# Patient Record
Sex: MALE | Race: WHITE | NOT HISPANIC OR LATINO | Employment: FULL TIME | URBAN - METROPOLITAN AREA
[De-identification: names, ages, dates, MRNs, and addresses within clinical notes are randomized per-mention and may not be internally consistent; named-entity substitution may affect disease eponyms.]

---

## 2017-10-24 LAB
EXTERNAL HIV CONFIRMATION: NORMAL
EXTERNAL HIV SCREEN: NORMAL

## 2018-06-18 ENCOUNTER — TRANSCRIBE ORDERS (OUTPATIENT)
Dept: ADMINISTRATIVE | Facility: HOSPITAL | Age: 53
End: 2018-06-18

## 2018-06-18 DIAGNOSIS — G47.30 SLEEP APNEA, UNSPECIFIED TYPE: Primary | ICD-10-CM

## 2021-04-06 DIAGNOSIS — Z23 ENCOUNTER FOR IMMUNIZATION: ICD-10-CM

## 2022-01-28 ENCOUNTER — APPOINTMENT (OUTPATIENT)
Dept: RADIOLOGY | Facility: CLINIC | Age: 57
End: 2022-01-28
Payer: COMMERCIAL

## 2022-01-28 VITALS
HEIGHT: 67 IN | SYSTOLIC BLOOD PRESSURE: 150 MMHG | BODY MASS INDEX: 39.39 KG/M2 | DIASTOLIC BLOOD PRESSURE: 90 MMHG | WEIGHT: 251 LBS

## 2022-01-28 DIAGNOSIS — M17.32 POST-TRAUMATIC OSTEOARTHRITIS OF LEFT KNEE: Primary | ICD-10-CM

## 2022-01-28 DIAGNOSIS — M25.562 LEFT KNEE PAIN, UNSPECIFIED CHRONICITY: ICD-10-CM

## 2022-01-28 PROCEDURE — 73564 X-RAY EXAM KNEE 4 OR MORE: CPT

## 2022-01-28 PROCEDURE — 20610 DRAIN/INJ JOINT/BURSA W/O US: CPT | Performed by: ORTHOPAEDIC SURGERY

## 2022-01-28 PROCEDURE — 99203 OFFICE O/P NEW LOW 30 MIN: CPT | Performed by: ORTHOPAEDIC SURGERY

## 2022-01-28 RX ORDER — ALBUTEROL SULFATE 90 UG/1
2 AEROSOL, METERED RESPIRATORY (INHALATION) EVERY 4 HOURS PRN
COMMUNITY
Start: 2021-11-02

## 2022-01-28 RX ORDER — SERTRALINE HYDROCHLORIDE 100 MG/1
150 TABLET, FILM COATED ORAL DAILY
COMMUNITY
Start: 2021-12-26

## 2022-01-28 RX ORDER — LEVOTHYROXINE SODIUM 0.12 MG/1
125 TABLET ORAL DAILY
COMMUNITY
Start: 2021-11-16 | End: 2022-11-16

## 2022-01-28 RX ORDER — MULTIVITAMIN
1 CAPSULE ORAL DAILY
COMMUNITY

## 2022-01-28 RX ORDER — MONTELUKAST SODIUM 10 MG/1
10 TABLET ORAL
COMMUNITY

## 2022-01-28 RX ORDER — CHOLECALCIFEROL (VITAMIN D3) 125 MCG
2000 CAPSULE ORAL DAILY
COMMUNITY

## 2022-01-28 RX ORDER — LISINOPRIL 10 MG/1
TABLET ORAL DAILY
COMMUNITY
Start: 2021-12-13

## 2022-01-28 RX ORDER — LIDOCAINE HYDROCHLORIDE 10 MG/ML
2.5 INJECTION, SOLUTION INFILTRATION; PERINEURAL
Status: COMPLETED | OUTPATIENT
Start: 2022-01-28 | End: 2022-01-28

## 2022-01-28 RX ORDER — METHYLPREDNISOLONE ACETATE 40 MG/ML
1 INJECTION, SUSPENSION INTRA-ARTICULAR; INTRALESIONAL; INTRAMUSCULAR; SOFT TISSUE
Status: COMPLETED | OUTPATIENT
Start: 2022-01-28 | End: 2022-01-28

## 2022-01-28 RX ORDER — PRAMIPEXOLE DIHYDROCHLORIDE 0.25 MG/1
0.25 TABLET ORAL
COMMUNITY
Start: 2022-01-09 | End: 2022-02-04 | Stop reason: SDUPTHER

## 2022-01-28 RX ORDER — OMEGA-3 FATTY ACIDS CAP DELAYED RELEASE 1000 MG 1000 MG
CAPSULE DELAYED RELEASE ORAL
COMMUNITY

## 2022-01-28 RX ADMIN — METHYLPREDNISOLONE ACETATE 1 ML: 40 INJECTION, SUSPENSION INTRA-ARTICULAR; INTRALESIONAL; INTRAMUSCULAR; SOFT TISSUE at 14:45

## 2022-01-28 RX ADMIN — LIDOCAINE HYDROCHLORIDE 2.5 ML: 10 INJECTION, SOLUTION INFILTRATION; PERINEURAL at 14:45

## 2022-01-28 NOTE — PROGRESS NOTES
Ortho Sports Medicine Knee Visit     Assesment:   left knee post traumatic osteoarthritis more pronounced medial, history ACL reconstruction    Plan:    Conservative treatment:    Ice to knee for 20 minutes at least 1-2 times daily  PT for ROM/strengthening to knee, hip and core  OTC NSAIDS prn for pain  Let pain guide gradual return activities  See patient back in 8 weeks    Imaging: All imaging from today was reviewed by myself and explained to the patient  Injection:    The risks and benefits of the injection (which include but are not limited to: infection, bleeding,damage to nerve/artery, need for further intervention), as well as the risks and benefits of all alternative treatments were explained and understood  The patient elected to proceed with injection  The procedure was done with aseptic technique, and the patient tolerated the procedure well with no complications  A corticosteroid injection was performed in left knee  The patient should take 1-2 days off of activity, with gradual return to activity as tolerated  Ice to the knee 1-2 times daily for 20 minutes, for next 24-48 hrs  Surgery:     No surgery is recommended at this point, continue with conservative treatment plan as noted  History of Present Illness: The patient is a 49 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, referred to me by themself, seen in clinic for evaluation of left knee pain  Pain is located anterior, medial   The patient rates the pain as a 2/10  The pain has been present for 5 months  The patient sustained no specific injury or trauma, just gradual progression of pain  He was being seen at [facility] by Dr La Score had CSI which helped for 6 weeks  He did not care for Dr Marti blunt  Patient has history of ACL reconstruction years ago by Dr Cecelia Mercedes at [facility]  He is wearing ACL functional brace which he just bought for support of his knee   He also has recent MRI of left knee showing stable ACL graft with grade 4 changes MFC  Daily dull ache in knee with sharp pain at times in medial aspect  Denies any instability  Pain is improved by rest, ice, NSAIDS, bracing and injection  Pain is aggravated by squatting and weight bearing, pivoting or twisting motions  Symptoms include clicking and swelling  The patient has tried rest, ice, NSAIDS, bracing and injection  Knee Surgical History:  ACL    Past Medical, Social and Family History:  Past Medical History:   Diagnosis Date    Asthma     Cancer ([facility] [address] )     Depression     Hypertension      Past Surgical History:   Procedure Laterality Date    ARTHROSCOPIC REPAIR ACL Left 2000    THYROID LOBECTOMY  2011     No Known Allergies  Current Outpatient Medications on File Prior to Visit   Medication Sig Dispense Refill    albuterol (Ventolin HFA) 90 mcg/act inhaler Inhale 2 puffs every 4 (four) hours as needed      Cholecalciferol (Vitamin D3) 50 MCG (2000 UT) TABS Take 2,000 Units by mouth daily      Ferrous Gluconate (IRON 27 PO) Take by mouth      levothyroxine 125 mcg tablet Take 125 mcg by mouth daily      montelukast (Singulair) 10 mg tablet Take 10 mg by mouth daily at bedtime      Multiple Vitamin (multivitamin) capsule Take 1 capsule by mouth daily      Omega-3 Fatty Acids (Fish Oil) 1000 MG CPDR Take by mouth      lisinopril (ZESTRIL) 10 mg tablet in the morning      pramipexole (MIRAPEX) 0 25 mg tablet       sertraline (ZOLOFT) 100 mg tablet 150 mg in the morning       No current facility-administered medications on file prior to visit       Social History     Socioeconomic History    Marital status: /Civil Union     Spouse name: Not on file    Number of children: Not on file    Years of education: Not on file    Highest education level: Not on file   Occupational History    Not on file   Tobacco Use    Smoking status: Never Smoker    Smokeless tobacco: Never Used   Substance and Sexual Activity    Alcohol use: Not on file    Drug use: Not on file    Sexual activity: Not on file   Other Topics Concern    Not on file   Social History Narrative    Not on file     Social Determinants of Health     Financial Resource Strain: Not on file   Food Insecurity: Not on file   Transportation Needs: Not on file   Physical Activity: Not on file   Stress: Not on file   Social Connections: Not on file   Intimate Partner Violence: Not on file   Housing Stability: Not on file         I have reviewed the past medical, surgical, social and family history, medications and allergies as documented in the EMR  Review of systems: ROS is negative other than that noted in the HPI  Constitutional: Negative for fatigue and fever  HENT: Negative for sore throat  Respiratory: Negative for shortness of breath  Cardiovascular: Negative for chest pain  Gastrointestinal: Negative for abdominal pain  Endocrine: Negative for cold intolerance and heat intolerance  Genitourinary: Negative for flank pain  Musculoskeletal: Negative for back pain  Skin: Negative for rash  Allergic/Immunologic: Negative for immunocompromised state  Neurological: Negative for dizziness  Psychiatric/Behavioral: Negative for agitation  Physical Exam:    Blood pressure 150/90, height 5' 7" (1 702 m), weight 114 kg (251 lb)      General/Constitutional: NAD, well developed, well nourished  HENT: Normocephalic, atraumatic  CV: Intact distal pulses, regular rate  Resp: No respiratory distress or labored breathing  Lymphatic: No lymphadenopathy palpated  Neuro: Alert and Oriented x 3, no focal deficits  Psych: Normal mood, normal affect, normal judgement, normal behavior  Skin: Warm, dry, no rashes, no erythema       Knee Exam (focused):                  RIGHT LEFT   ROM:   0-130 0-130   Palpation: Effusion negative mild     MJL tenderness Negative Positive     LJL tenderness Negative Negative   Instability: Varus stable stable     Valgus stable stable Special Tests: Lachman Negative Negative     Posterior drawer Negative Negative     Anterior drawer Negative Negative     Pivot shift not tested not tested     Dial not tested not tested   Patella: Palpation no tenderness Medial facet     Mobility 1/4 1/4     Apprehension Negative Negative   Other: Single leg 1/4 squat not tested not tested      LE NV Exam: +2 DP/PT pulses bilaterally  Sensation intact to light touch L2-S1 bilaterally     Bilateral hip ROM demonstrates no pain actively or passively    No calf tenderness to palpation bilaterally    Knee Imaging    X-rays of the left knee were reviewed, which demonstrate post surgical changes ACL reconstruction with stable hardware screw fixation, degenerative changes medial and patellofemoral compartments with spurring, marginal osteophytes  I have reviewed the radiology report and do not currently have a radiology reading from [facility], but will check the result once the reading is performed  MRI of the left knee report  were reviewed, which demonstrate stable ACL graft, post surgical changes in line with medial meniscectomy, post traumatic arthritis grade IV MFC  I have reviewed the radiology report and agree with their impression  Large joint arthrocentesis: L knee  Universal Protocol:  Consent: Verbal consent obtained    Risks and benefits: risks, benefits and alternatives were discussed  Consent given by: patient  Patient understanding: patient states understanding of the procedure being performed  Site marked: the operative site was marked  Patient identity confirmed: verbally with patient    Supporting Documentation  Indications: pain   Procedure Details  Location: knee - L knee  Preparation: Patient was prepped and draped in the usual sterile fashion  Needle size: 22 G  Ultrasound guidance: no  Approach: anterolateral  Medications administered: 2 5 mL lidocaine 1 %; 1 mL methylPREDNISolone acetate 40 mg/mL    Patient tolerance: patient tolerated the procedure well with no immediate complications  Dressing:  Sterile dressing applied          Scribe Attestation    I,:   am acting as a scribe while in the presence of the attending physician :       I,:   personally performed the services described in this documentation    as scribed in my presence :

## 2022-09-29 VITALS
WEIGHT: 256 LBS | BODY MASS INDEX: 40.18 KG/M2 | SYSTOLIC BLOOD PRESSURE: 148 MMHG | HEART RATE: 66 BPM | HEIGHT: 67 IN | DIASTOLIC BLOOD PRESSURE: 91 MMHG

## 2022-09-29 DIAGNOSIS — M17.32 POST-TRAUMATIC OSTEOARTHRITIS OF LEFT KNEE: Primary | ICD-10-CM

## 2022-09-29 PROCEDURE — 99213 OFFICE O/P EST LOW 20 MIN: CPT | Performed by: ORTHOPAEDIC SURGERY

## 2022-09-29 RX ORDER — ATORVASTATIN CALCIUM 10 MG/1
TABLET, FILM COATED ORAL
COMMUNITY
Start: 2022-04-08

## 2022-09-29 NOTE — PROGRESS NOTES
Ortho Sports Medicine Knee Visit     Assesment:   left knee osteoarthritis    Plan:    The patient has periodic flares of symptoms  He is currently doing well  He did find lasting relief from last steroid injection  Plan to return 1/2023 prior to vacation is made for consideration of steroid injection  Follow up in 3 months or as needed  Conservative treatment:    Ice to knee for 20 minutes at least 1-2 times daily  OTC NSAIDS prn for pain  Imaging: All imaging from today was reviewed by myself and explained to the patient  Injection:    No Injection planned at this time  Patient will return in 1/2022 if needed      Surgery:     No surgery is recommended at this point, continue with conservative treatment plan as noted  History of Present Illness: The patient presents for follow up of left knee  He is s/p left knee steriod injection with lasting benefit, 1/28/2022  Today he complains of occasional left anteromedial > anterolateral left knee pain  He rates hs pan at 0/10 and 8/10 at its worse  He will use Advil and Voltaren gel on occasion  He has history of left ACL reconstruction 20 years ago  I have reviewed the past medical, surgical, social and family history, medications and allergies as documented in the EMR  Review of systems: ROS is negative other than that noted in the HPI  Constitutional: Negative for fatigue and fever  Cardiovascular: Negative for chest pain  Pulmonary: negative for shortness of breath    PMH/PSH:  Past Medical History:   Diagnosis Date    Asthma     Cancer ([facility] [address] [facility]     Depression     Hypertension      Past Surgical History:   Procedure Laterality Date    APPENDECTOMY      ARTHROSCOPIC REPAIR ACL Left 2000    HERNIA REPAIR      THYROID LOBECTOMY  2011        Physical Exam:    Blood pressure 148/91, pulse 66, height 5' 7" (1 702 m), weight 116 kg (256 lb)      General/Constitutional: NAD, well developed, well nourished  HENT: Normocephalic, atraumatic  CV: Intact distal pulses, regular rate  Resp: No respiratory distress or labored breathing  Lymphatic: No lymphadenopathy palpated  Neuro: Alert and Oriented x 3, no focal deficits  Psych: Normal mood, normal affect, normal judgement, normal behavior  Skin: Warm, dry, no rashes, no erythema       Knee Exam (focused):                   RIGHT LEFT   ROM:   0-130 0-130   Palpation: Effusion negative negative     MJL tenderness mild Negative     LJL tenderness Negative Negative   Instability: Varus stable stable     Valgus stable stable   Special Tests: Lachman Negative Negative     Posterior drawer Negative Negative     Anterior drawer Negative Negative     Pivot shift not tested not tested     Dial not tested not tested   Patella: Palpation no tenderness no tenderness     Mobility 1/4 1/4     Apprehension Negative Negative   Other: Single leg 1/4 squat not tested not tested      LE NV Exam: +2 DP/PT pulses bilaterally  Sensation intact to light touch L2-S1 bilaterally    No calf tenderness to palpation bilaterally      Knee Imaging    None performed     Scribe Attestation    I,:  Arpan Morales am acting as a scribe while in the presence of the attending physician :       I,:  Sebastien Giraldo DO personally performed the services described in this documentation    as scribed in my presence :

## 2023-01-04 ENCOUNTER — OFFICE VISIT (OUTPATIENT)
Dept: OBGYN CLINIC | Facility: MEDICAL CENTER | Age: 58
End: 2023-01-04

## 2023-01-04 VITALS
HEIGHT: 67 IN | DIASTOLIC BLOOD PRESSURE: 90 MMHG | BODY MASS INDEX: 38.77 KG/M2 | SYSTOLIC BLOOD PRESSURE: 141 MMHG | WEIGHT: 247 LBS | HEART RATE: 76 BPM

## 2023-01-04 DIAGNOSIS — M17.32 POST-TRAUMATIC OSTEOARTHRITIS OF LEFT KNEE: Primary | ICD-10-CM

## 2023-01-04 RX ORDER — METHYLPREDNISOLONE ACETATE 40 MG/ML
1 INJECTION, SUSPENSION INTRA-ARTICULAR; INTRALESIONAL; INTRAMUSCULAR; SOFT TISSUE
Status: COMPLETED | OUTPATIENT
Start: 2023-01-04 | End: 2023-01-04

## 2023-01-04 RX ORDER — BUPIVACAINE HYDROCHLORIDE 5 MG/ML
2 INJECTION, SOLUTION EPIDURAL; INTRACAUDAL
Status: COMPLETED | OUTPATIENT
Start: 2023-01-04 | End: 2023-01-04

## 2023-01-04 RX ADMIN — BUPIVACAINE HYDROCHLORIDE 2 ML: 5 INJECTION, SOLUTION EPIDURAL; INTRACAUDAL at 14:07

## 2023-01-04 RX ADMIN — METHYLPREDNISOLONE ACETATE 1 ML: 40 INJECTION, SUSPENSION INTRA-ARTICULAR; INTRALESIONAL; INTRAMUSCULAR; SOFT TISSUE at 14:07

## 2023-01-04 NOTE — PROGRESS NOTES
Ortho Sports Medicine Knee Visit     Assesment:   left knee post traumatic osteoarthritis    Plan:    Conservative treatment:    Ice to knee for 20 minutes at least 1-2 times daily  OTC NSAIDS prn for pain  Repeat CSI in 3 months if needed  Imaging:    No imaging was available for review today  Injection:    The risks and benefits of the injection (which include but are not limited to: infection, bleeding,damage to nerve/artery, need for further intervention), as well as the risks and benefits of all alternative treatments were explained and understood  The patient elected to proceed with injection  The procedure was done with aseptic technique, and the patient tolerated the procedure well with no complications  A corticosteroid injection was performed  The patient should take 1-2 days off of activity, with gradual return to activity as tolerated  Ice to the knee 1-2 times daily for 20 minutes, for next 24-48 hrs  Surgery:     No surgery is recommended at this point, continue with conservative treatment plan as noted  History of Present Illness:    Patient presents for follow up of left knee  He is s/p left knee steriod injection with lasting benefit, 9/29/2022   he states the last injection has worn off  Pain medial based primarily  He rates his pain at 0/10 and 8/10 at its worse  He will use Advil and Voltaren gel on occasion  He has history of left ACL reconstruction 20 years ago  Requesting repeat CSI today prior to vacation  I have reviewed the past medical, surgical, social and family history, medications and allergies as documented in the EMR  Review of systems: ROS is negative other than that noted in the HPI  Constitutional: Negative for fatigue and fever     Cardiovascular: Negative for chest pain  Pulmonary: negative for shortness of breath    PMH/PSH:  Past Medical History:   Diagnosis Date   • Asthma    • Cancer [facility])    • Depression    • Hypertension Past Surgical History:   Procedure Laterality Date   • APPENDECTOMY     • ARTHROSCOPIC REPAIR ACL Left 2000   • HERNIA REPAIR     • THYROID LOBECTOMY  2011        Physical Exam:    Blood pressure 141/90, pulse 76, height 5' 7" (1 702 m), weight 112 kg (247 lb)  General/Constitutional: NAD, well developed, well nourished  HENT: Normocephalic, atraumatic  CV: Intact distal pulses, regular rate  Resp: No respiratory distress or labored breathing  Lymphatic: No lymphadenopathy palpated  Neuro: Alert and Oriented x 3, no focal deficits  Psych: Normal mood, normal affect, normal judgement, normal behavior  Skin: Warm, dry, no rashes, no erythema       Knee Exam (focused): RIGHT LEFT   ROM:   0-130 0-130   Palpation: Effusion negative negative     MJL tenderness Negative Positive     LJL tenderness Negative Negative   Instability: Varus stable stable     Valgus stable stable   Special Tests: Lachman Negative Negative     Posterior drawer Negative Negative     Anterior drawer Negative Negative     Pivot shift not tested not tested     Dial not tested not tested   Patella: Palpation no tenderness no tenderness     Mobility 1/4 1/4     Apprehension Negative Negative   Other: Single leg 1/4 squat not tested not tested      LE NV Exam: +2 DP/PT pulses bilaterally  Sensation intact to light touch L2-S1 bilaterally    No calf tenderness to palpation bilaterally      Knee Imaging    No new imaging to review     Large joint arthrocentesis: L knee  Universal Protocol:  Consent: Verbal consent obtained    Risks and benefits: risks, benefits and alternatives were discussed  Consent given by: patient  Patient understanding: patient states understanding of the procedure being performed  Site marked: the operative site was marked  Patient identity confirmed: verbally with patient    Supporting Documentation  Indications: pain   Procedure Details  Location: knee - L knee  Preparation: Patient was prepped and draped in the usual sterile fashion  Needle size: 22 G  Ultrasound guidance: no  Approach: anterolateral  Medications administered: 2 mL bupivacaine (PF) 0 5 %; 1 mL methylPREDNISolone acetate 40 mg/mL    Patient tolerance: patient tolerated the procedure well with no immediate complications  Dressing:  Sterile dressing applied          Scribe Attestation    I,:  Goyo Paula am acting as a scribe while in the presence of the attending physician :       I,:  48 Radha Giraldo DO personally performed the services described in this documentation    as scribed in my presence :

## 2024-02-28 ENCOUNTER — TELEPHONE (OUTPATIENT)
Age: 59
End: 2024-02-28

## 2024-05-14 ENCOUNTER — TELEPHONE (OUTPATIENT)
Dept: ADMINISTRATIVE | Facility: OTHER | Age: 59
End: 2024-05-14

## 2024-05-14 NOTE — TELEPHONE ENCOUNTER
Upon review of the In Basket request we were able to locate, review, and update the patient chart as requested for HIV.    Any additional questions or concerns should be emailed to the Practice Liaisons via the appropriate education email address, please do not reply via In Basket.    Thank you  Noa Mcconnell

## 2024-05-14 NOTE — TELEPHONE ENCOUNTER
----- Message from Andrews Ibarra sent at 5/14/2024  9:13 AM EDT -----  Regarding: HIV test  05/14/24 9:13 AM    Hello, our patient attached above has had HIV completed/performed. Please assist in updating the patient chart by pulling the Care Everywhere (CE) document. The date of service is 10/24/2017.     Thank you,  ANDREWS IBARRA  [facility]

## 2024-05-15 ENCOUNTER — OFFICE VISIT (OUTPATIENT)
Dept: FAMILY MEDICINE CLINIC | Facility: CLINIC | Age: 59
End: 2024-05-15
Payer: COMMERCIAL

## 2024-05-15 VITALS
SYSTOLIC BLOOD PRESSURE: 128 MMHG | HEIGHT: 68 IN | DIASTOLIC BLOOD PRESSURE: 80 MMHG | OXYGEN SATURATION: 95 % | BODY MASS INDEX: 38.74 KG/M2 | HEART RATE: 73 BPM | WEIGHT: 255.6 LBS | TEMPERATURE: 97.5 F

## 2024-05-15 DIAGNOSIS — Z76.89 ESTABLISHING CARE WITH NEW DOCTOR, ENCOUNTER FOR: Primary | ICD-10-CM

## 2024-05-15 DIAGNOSIS — R73.03 PREDIABETES: ICD-10-CM

## 2024-05-15 DIAGNOSIS — I10 PRIMARY HYPERTENSION: ICD-10-CM

## 2024-05-15 DIAGNOSIS — Z12.5 SCREENING FOR PROSTATE CANCER: ICD-10-CM

## 2024-05-15 DIAGNOSIS — E78.01 FAMILIAL HYPERCHOLESTEROLEMIA: ICD-10-CM

## 2024-05-15 DIAGNOSIS — E89.0 POSTOPERATIVE HYPOTHYROIDISM: ICD-10-CM

## 2024-05-15 DIAGNOSIS — C73 MALIGNANT NEOPLASM OF THYROID GLAND (HCC): ICD-10-CM

## 2024-05-15 PROCEDURE — 99214 OFFICE O/P EST MOD 30 MIN: CPT | Performed by: FAMILY MEDICINE

## 2024-05-15 NOTE — PROGRESS NOTES
Ambulatory Visit  Name: Dayne Kennedy      : 1965      MRN: [id number]  Encounter Provider: Alicia Barron DO  Encounter Date: 5/15/2024   Encounter department: [facility]    Assessment & Plan   1. Establishing care with new doctor, encounter for  2. Primary hypertension  -     Comprehensive metabolic panel; Future; Expected date: 10/31/2024  3. Prediabetes  -     Hemoglobin A1C; Future; Expected date: 10/31/2024  -     Comprehensive metabolic panel; Future; Expected date: 10/31/2024  4. Postoperative hypothyroidism  -     TSH, 3rd generation; Future; Expected date: 10/31/2024  5. Screening for prostate cancer  -     PSA, Total Screen; Future; Expected date: 10/31/2024  6. Familial hypercholesterolemia  -     Lipid Panel with Direct LDL reflex; Future; Expected date: 10/31/2024  7. Malignant neoplasm of thyroid gland (HCC)  -     TSH, 3rd generation; Future; Expected date: 10/31/2024      Depression Screening and Follow-up Plan: Patient was screened for depression during today's encounter. They screened negative with a PHQ-2 score of 2.      Chief Complaint   Patient presents with   • Follow-up     New pt , needs primary care his dr retired    Patient establishing care here, Dr. Monica Alvarez has left [facility] practice.  Patient follows with endocrinology for history of thyroid cancer and thyroidectomy.  On Synthroid.  Also prediabetes.  Dental UTD   Eye Check up UTD  DERM yearly-- ASA  Works in IT at [employer]  History of Present Illness     Here to establish care      Review of Systems   HENT:  Positive for tinnitus (Chronic). Negative for hearing loss.    Gastrointestinal: Negative.         Occasional blood on toilet paper.  Has had negative Cologuard.  Defers colonoscopy.   Genitourinary: Negative.    Neurological: Negative.    Hematological:  Does not bruise/bleed easily.   All other systems reviewed and are negative.    Past Medical History:   Diagnosis Date   •  "Asthma    • Cancer (HCC)    • Depression    • Hypertension      Past Surgical History:   Procedure Laterality Date   • APPENDECTOMY  2020   • ARTHROSCOPIC REPAIR ACL Left 2000   • HERNIA REPAIR     • THYROID LOBECTOMY  2011     Family History   Problem Relation Age of Onset   • Dementia Mother    • Hypertension Mother    • Hypertension Father    • Lung cancer Father      Social History     Tobacco Use   • Smoking status: Never   • Smokeless tobacco: Never   Vaping Use   • Vaping status: Never Used   Substance and Sexual Activity   • Alcohol use: Not on file     Comment: rarely   • Drug use: Never   • Sexual activity: Yes     Partners: Male     Current Outpatient Medications on File Prior to Visit   Medication Sig   • albuterol (PROVENTIL HFA,VENTOLIN HFA) 90 mcg/act inhaler Inhale 2 puffs every 4 (four) hours as needed   • atorvastatin (LIPITOR) 10 mg tablet TAKE 1 TABLET(10 MG) BY MOUTH EVERY NIGHT   • Cholecalciferol (Vitamin D3) 50 MCG (2000 UT) TABS Take 2,000 Units by mouth daily   • Glucosamine HCl (GLUCOSAMINE PO) Take by mouth daily   • levothyroxine 125 mcg tablet Take 125 mcg by mouth daily   • lisinopril (ZESTRIL) 10 mg tablet in the morning   • montelukast (SINGULAIR) 10 mg tablet Take 10 mg by mouth daily at bedtime   • Multiple Vitamin (multivitamin) capsule Take 1 capsule by mouth daily   • Omega-3 Fatty Acids (Fish Oil) 1000 MG CPDR Take by mouth   • pramipexole (MIRAPEX) 0.25 mg tablet Take 2 or 3 by mouth at bedtime   • sertraline (ZOLOFT) 100 mg tablet 150 mg in the morning   • [DISCONTINUED] Ferrous Gluconate (IRON 27 PO) Take by mouth (Patient not taking: Reported on 9/29/2022)     No Known Allergies  Immunization History   Administered Date(s) Administered   • COVID-19 MODERNA VACC 0.5 ML IM 03/30/2021, 04/27/2021, 01/18/2022   • COVID-19 Moderna mRNA Vaccine 12 Yr+ 50 mcg/0.5 mL (Spikevax) 12/04/2023     Objective     /80   Pulse 73   Temp 97.5 °F (36.4 °C) (Temporal)   Ht 5' 7.76\" " (1.721 m)   Wt 116 kg (255 lb 9.6 oz)   SpO2 95%   BMI 39.14 kg/m²     Physical Exam  Vitals and nursing note reviewed.   Constitutional:       General: He is not in acute distress.     Appearance: Normal appearance. He is well-developed. He is obese.   HENT:      Head: Normocephalic.      Right Ear: Tympanic membrane normal.      Left Ear: Tympanic membrane normal.      Nose: Nose normal.      Mouth/Throat:      Mouth: Mucous membranes are moist.   Eyes:      Conjunctiva/sclera: Conjunctivae normal.   Neck:      Vascular: No carotid bruit.   Cardiovascular:      Rate and Rhythm: Normal rate and regular rhythm.      Heart sounds: No murmur heard.  Pulmonary:      Effort: Pulmonary effort is normal. No respiratory distress.      Breath sounds: Normal breath sounds.   Abdominal:      Palpations: Abdomen is soft.      Tenderness: There is no abdominal tenderness.      Comments: No abdominal or femoral bruit   Musculoskeletal:         General: No swelling.   Skin:     Findings: No rash.   Neurological:      Mental Status: He is alert and oriented to person, place, and time.      Gait: Gait normal.   Psychiatric:         Mood and Affect: Mood normal.         Behavior: Behavior normal.         Thought Content: Thought content normal.         Judgment: Judgment normal.       Administrative Statements   I have spent a total time of 25 minutes on 05/15/24 In caring for this patient including Diagnostic results, Instructions for management, Patient and family education, Importance of tx compliance, Risk factor reductions, Impressions, Counseling / Coordination of care, Documenting in the medical record, Reviewing / ordering tests, medicine, procedures  , and Obtaining or reviewing history  .

## 2024-06-04 DIAGNOSIS — Z00.6 ENCOUNTER FOR EXAMINATION FOR NORMAL COMPARISON OR CONTROL IN CLINICAL RESEARCH PROGRAM: ICD-10-CM

## 2024-06-10 ENCOUNTER — APPOINTMENT (OUTPATIENT)
Dept: LAB | Facility: CLINIC | Age: 59
End: 2024-06-10

## 2024-06-10 DIAGNOSIS — Z00.6 ENCOUNTER FOR EXAMINATION FOR NORMAL COMPARISON OR CONTROL IN CLINICAL RESEARCH PROGRAM: ICD-10-CM

## 2024-06-10 PROCEDURE — 36415 COLL VENOUS BLD VENIPUNCTURE: CPT

## 2024-06-26 LAB
APOB+LDLR+PCSK9 GENE MUT ANL BLD/T: NOT DETECTED
BRCA1+BRCA2 DEL+DUP + FULL MUT ANL BLD/T: NOT DETECTED
MLH1+MSH2+MSH6+PMS2 GN DEL+DUP+FUL M: NOT DETECTED

## 2024-07-17 ENCOUNTER — HOSPITAL ENCOUNTER (OUTPATIENT)
Dept: RADIOLOGY | Facility: HOSPITAL | Age: 59
Discharge: HOME | End: 2024-07-17
Attending: PODIATRIST
Payer: COMMERCIAL

## 2024-07-17 DIAGNOSIS — M84.376A STRESS FRACTURE OF FOOT, INITIAL ENCOUNTER: ICD-10-CM

## 2024-07-17 PROCEDURE — 73700 CT LOWER EXTREMITY W/O DYE: CPT

## 2024-10-29 ENCOUNTER — RA CDI HCC (OUTPATIENT)
Dept: OTHER | Facility: HOSPITAL | Age: 59
End: 2024-10-29

## 2024-10-29 NOTE — PROGRESS NOTES
HCC coding opportunities          Chart Reviewed number of suggestions sent to Provider: 1     Patients Insurance        Commercial Insurance: [employer] Commercial [employer]       Mild intermittent asthma without complication [[id number]]     Refer to medication list refill done on 1/28/2022 - please review and assess and document per MEAT if applicable for 2024

## 2024-11-04 ENCOUNTER — APPOINTMENT (OUTPATIENT)
Dept: LAB | Facility: CLINIC | Age: 59
End: 2024-11-04
Payer: COMMERCIAL

## 2024-11-04 DIAGNOSIS — R73.03 PREDIABETES: ICD-10-CM

## 2024-11-04 DIAGNOSIS — Z12.5 SCREENING FOR PROSTATE CANCER: ICD-10-CM

## 2024-11-04 DIAGNOSIS — Z85.850 PERSONAL HISTORY OF MALIGNANT NEOPLASM OF THYROID: ICD-10-CM

## 2024-11-04 DIAGNOSIS — C73 MALIGNANT NEOPLASM OF THYROID GLAND (HCC): ICD-10-CM

## 2024-11-04 DIAGNOSIS — E89.0 POSTSURGICAL HYPOTHYROIDISM: ICD-10-CM

## 2024-11-04 DIAGNOSIS — E78.01 FAMILIAL HYPERCHOLESTEROLEMIA: ICD-10-CM

## 2024-11-04 DIAGNOSIS — I10 PRIMARY HYPERTENSION: ICD-10-CM

## 2024-11-04 DIAGNOSIS — E89.0 POSTOPERATIVE HYPOTHYROIDISM: ICD-10-CM

## 2024-11-04 LAB
ALBUMIN SERPL BCG-MCNC: 4 G/DL (ref 3.5–5)
ALP SERPL-CCNC: 57 U/L (ref 34–104)
ALT SERPL W P-5'-P-CCNC: 17 U/L (ref 7–52)
ANION GAP SERPL CALCULATED.3IONS-SCNC: 9 MMOL/L (ref 4–13)
AST SERPL W P-5'-P-CCNC: 16 U/L (ref 13–39)
BILIRUB SERPL-MCNC: 0.42 MG/DL (ref 0.2–1)
BUN SERPL-MCNC: 18 MG/DL (ref 5–25)
CALCIUM SERPL-MCNC: 8.3 MG/DL (ref 8.4–10.2)
CHLORIDE SERPL-SCNC: 104 MMOL/L (ref 96–108)
CHOLEST SERPL-MCNC: 149 MG/DL
CO2 SERPL-SCNC: 29 MMOL/L (ref 21–32)
CREAT SERPL-MCNC: 0.88 MG/DL (ref 0.6–1.3)
EST. AVERAGE GLUCOSE BLD GHB EST-MCNC: 120 MG/DL
GFR SERPL CREATININE-BSD FRML MDRD: 95 ML/MIN/1.73SQ M
GLUCOSE P FAST SERPL-MCNC: 100 MG/DL (ref 65–99)
HBA1C MFR BLD: 5.8 %
HDLC SERPL-MCNC: 38 MG/DL
LDLC SERPL CALC-MCNC: 77 MG/DL (ref 0–100)
POTASSIUM SERPL-SCNC: 4 MMOL/L (ref 3.5–5.3)
PROT SERPL-MCNC: 6.7 G/DL (ref 6.4–8.4)
PSA SERPL-MCNC: 2.32 NG/ML (ref 0–4)
SODIUM SERPL-SCNC: 142 MMOL/L (ref 135–147)
TRIGL SERPL-MCNC: 170 MG/DL
TSH SERPL DL<=0.05 MIU/L-ACNC: 0.96 UIU/ML (ref 0.45–4.5)

## 2024-11-04 PROCEDURE — 86800 THYROGLOBULIN ANTIBODY: CPT

## 2024-11-04 PROCEDURE — 84432 ASSAY OF THYROGLOBULIN: CPT

## 2024-11-04 PROCEDURE — 84443 ASSAY THYROID STIM HORMONE: CPT

## 2024-11-04 PROCEDURE — G0103 PSA SCREENING: HCPCS

## 2024-11-04 PROCEDURE — 36415 COLL VENOUS BLD VENIPUNCTURE: CPT

## 2024-11-04 PROCEDURE — 83036 HEMOGLOBIN GLYCOSYLATED A1C: CPT

## 2024-11-04 PROCEDURE — 80053 COMPREHEN METABOLIC PANEL: CPT

## 2024-11-04 PROCEDURE — 80061 LIPID PANEL: CPT

## 2024-11-05 LAB
THYROGLOB AB SERPL-ACNC: <1 IU/ML (ref 0–0.9)
THYROGLOB SERPL-MCNC: 0.1 NG/ML (ref 1.4–29.2)

## 2024-11-08 ENCOUNTER — OFFICE VISIT (OUTPATIENT)
Dept: FAMILY MEDICINE CLINIC | Facility: CLINIC | Age: 59
End: 2024-11-08
Payer: COMMERCIAL

## 2024-11-08 ENCOUNTER — TELEPHONE (OUTPATIENT)
Age: 59
End: 2024-11-08

## 2024-11-08 VITALS
TEMPERATURE: 97.4 F | DIASTOLIC BLOOD PRESSURE: 88 MMHG | HEIGHT: 68 IN | RESPIRATION RATE: 16 BRPM | OXYGEN SATURATION: 98 % | SYSTOLIC BLOOD PRESSURE: 136 MMHG | WEIGHT: 260 LBS | HEART RATE: 67 BPM | BODY MASS INDEX: 39.4 KG/M2

## 2024-11-08 DIAGNOSIS — J30.2 SEASONAL ALLERGIES: ICD-10-CM

## 2024-11-08 DIAGNOSIS — F41.9 ANXIETY AND DEPRESSION: ICD-10-CM

## 2024-11-08 DIAGNOSIS — E78.01 FAMILIAL HYPERCHOLESTEROLEMIA: ICD-10-CM

## 2024-11-08 DIAGNOSIS — R73.03 PREDIABETES: ICD-10-CM

## 2024-11-08 DIAGNOSIS — Z00.00 ANNUAL PHYSICAL EXAM: Primary | ICD-10-CM

## 2024-11-08 DIAGNOSIS — Z85.850 HISTORY OF PAPILLARY ADENOCARCINOMA OF THYROID: ICD-10-CM

## 2024-11-08 DIAGNOSIS — F32.A ANXIETY AND DEPRESSION: ICD-10-CM

## 2024-11-08 DIAGNOSIS — G25.81 RESTLESS LEG SYNDROME: ICD-10-CM

## 2024-11-08 DIAGNOSIS — I10 PRIMARY HYPERTENSION: ICD-10-CM

## 2024-11-08 PROCEDURE — 99396 PREV VISIT EST AGE 40-64: CPT | Performed by: FAMILY MEDICINE

## 2024-11-08 RX ORDER — SERTRALINE HYDROCHLORIDE 100 MG/1
150 TABLET, FILM COATED ORAL DAILY
Qty: 90 TABLET | Refills: 1 | Status: SHIPPED | OUTPATIENT
Start: 2024-11-08

## 2024-11-08 RX ORDER — LISINOPRIL 20 MG/1
20 TABLET ORAL DAILY
Qty: 90 TABLET | Refills: 1 | Status: SHIPPED | OUTPATIENT
Start: 2024-11-08

## 2024-11-08 RX ORDER — SEMAGLUTIDE 0.25 MG/.5ML
INJECTION, SOLUTION SUBCUTANEOUS
Qty: 2 ML | Refills: 0 | Status: SHIPPED | OUTPATIENT
Start: 2024-11-08 | End: 2024-12-09

## 2024-11-08 RX ORDER — ATORVASTATIN CALCIUM 10 MG/1
10 TABLET, FILM COATED ORAL DAILY
Qty: 90 TABLET | Refills: 1 | Status: SHIPPED | OUTPATIENT
Start: 2024-11-08

## 2024-11-08 RX ORDER — OMEPRAZOLE 20 MG/1
TABLET, DELAYED RELEASE ORAL
COMMUNITY
Start: 2019-05-01

## 2024-11-08 RX ORDER — PRAMIPEXOLE DIHYDROCHLORIDE 0.75 MG/1
0.75 TABLET ORAL
Qty: 90 TABLET | Refills: 1 | Status: SHIPPED | OUTPATIENT
Start: 2024-11-08

## 2024-11-08 RX ORDER — LISINOPRIL 20 MG/1
20 TABLET ORAL DAILY
COMMUNITY
Start: 2024-03-04 | End: 2024-11-08 | Stop reason: SDUPTHER

## 2024-11-08 RX ORDER — MONTELUKAST SODIUM 10 MG/1
10 TABLET ORAL
Qty: 90 TABLET | Refills: 1 | Status: SHIPPED | OUTPATIENT
Start: 2024-11-08

## 2024-11-08 RX ORDER — LISINOPRIL 10 MG/1
10 TABLET ORAL DAILY
Qty: 90 TABLET | Refills: 1 | Status: SHIPPED | OUTPATIENT
Start: 2024-11-08

## 2024-11-08 RX ORDER — PRAMIPEXOLE DIHYDROCHLORIDE 0.75 MG/1
TABLET ORAL
COMMUNITY
Start: 2024-10-07 | End: 2024-11-08 | Stop reason: SDUPTHER

## 2024-11-08 NOTE — ASSESSMENT & PLAN NOTE
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

## 2024-11-08 NOTE — PATIENT INSTRUCTIONS
"Patient Education     Routine physical for adults   The Basics   Written by the doctors and editors at [facility]   What is a physical? -- A physical is a routine visit, or \"check-up,\" with your doctor. You might also hear it called a \"wellness visit\" or \"preventive visit.\"  During each visit, the doctor will:   Ask about your physical and mental health   Ask about your habits, behaviors, and lifestyle   Do an exam   Give you vaccines if needed   Talk to you about any medicines you take   Give advice about your health   Answer your questions  Getting regular check-ups is an important part of taking care of your health. It can help your doctor find and treat any problems you have. But it's also important for preventing health problems.  A routine physical is different from a \"sick visit.\" A sick visit is when you see a doctor because of a health concern or problem. Since physicals are scheduled ahead of time, you can think about what you want to ask the doctor.  How often should I get a physical? -- It depends on your age and health. In general, for people age 21 years and older:   If you are younger than 50 years, you might be able to get a physical every 3 years.   If you are 50 years or older, your doctor might recommend a physical every year.  If you have an ongoing health condition, like diabetes or high blood pressure, your doctor will probably want to see you more often.  What happens during a physical? -- In general, each visit will include:   Physical exam - The doctor or nurse will check your height, weight, heart rate, and blood pressure. They will also look at your eyes and ears. They will ask about how you are feeling and whether you have any symptoms that bother you.   Medicines - It's a good idea to bring a list of all the medicines you take to each doctor visit. Your doctor will talk to you about your medicines and answer any questions. Tell them if you are having any side effects that bother you. You " "should also tell them if you are having trouble paying for any of your medicines.   Habits and behaviors - This includes:   Your diet   Your exercise habits   Whether you smoke, drink alcohol, or use drugs   Whether you are sexually active   Whether you feel safe at home  Your doctor will talk to you about things you can do to improve your health and lower your risk of health problems. They will also offer help and support. For example, if you want to quit smoking, they can give you advice and might prescribe medicines. If you want to improve your diet or get more physical activity, they can help you with this, too.   Lab tests, if needed - The tests you get will depend on your age and situation. For example, your doctor might want to check your:   Cholesterol   Blood sugar   Iron level   Vaccines - The recommended vaccines will depend on your age, health, and what vaccines you already had. Vaccines are very important because they can prevent certain serious or deadly infections.   Discussion of screening - \"Screening\" means checking for diseases or other health problems before they cause symptoms. Your doctor can recommend screening based on your age, risk, and preferences. This might include tests to check for:   Cancer, such as breast, prostate, cervical, ovarian, colorectal, prostate, lung, or skin cancer   Sexually transmitted infections, such as chlamydia and gonorrhea   Mental health conditions like depression and anxiety  Your doctor will talk to you about the different types of screening tests. They can help you decide which screenings to have. They can also explain what the results might mean.   Answering questions - The physical is a good time to ask the doctor or nurse questions about your health. If needed, they can refer you to other doctors or specialists, too.  Adults older than 65 years often need other care, too. As you get older, your doctor will talk to you about:   How to prevent falling at " home   Hearing or vision tests   Memory testing   How to take your medicines safely   Making sure that you have the help and support you need at home  All topics are updated as new evidence becomes available and our peer review process is complete.  This topic retrieved from [employer] on: May 02, 2024.  Topic 992828 Version 1.0  Release: 32.4.3 - C32.122  © 2024 UpToDate, Inc. and/or its affiliates. All rights reserved.  Consumer Information Use and Disclaimer   Disclaimer: This generalized information is a limited summary of diagnosis, treatment, and/or medication information. It is not meant to be comprehensive and should be used as a tool to help the user understand and/or assess potential diagnostic and treatment options. It does NOT include all information about conditions, treatments, medications, side effects, or risks that may apply to a specific patient. It is not intended to be medical advice or a substitute for the medical advice, diagnosis, or treatment of a health care provider based on the health care provider's examination and assessment of a patient's specific and unique circumstances. Patients must speak with a health care provider for complete information about their health, medical questions, and treatment options, including any risks or benefits regarding use of medications. This information does not endorse any treatments or medications as safe, effective, or approved for treating a specific patient. UpToDate, Inc. and its affiliates disclaim any warranty or liability relating to this information or the use thereof.The use of this information is governed by the Terms of Use, available at https://www.[employer] [place].com/en/know/clinical-effectiveness-terms. 2024© UpToDate, Inc. and its affiliates and/or licensors. All rights reserved.  Copyright   © 2024 UpToDate, Inc. and/or its affiliates. All rights reserved.

## 2024-11-08 NOTE — PROGRESS NOTES
Adult Annual Physical  Name: Dayne Kennedy      : 1965      MRN: [id number]  Encounter Provider: Alicia Barron DO  Encounter Date: 2024   Encounter department: [facility] PRIMARY CARE    Assessment & Plan  Annual physical exam         Primary hypertension    Orders:    Semaglutide-Weight Management (Wegovy) 0.25 MG/0.5ML; Inject 0.25 mg under the skin weekly    lisinopril (ZESTRIL) 10 mg tablet; Take 1 tablet (10 mg total) by mouth in the morning    lisinopril (ZESTRIL) 20 mg tablet; Take 1 tablet (20 mg total) by mouth daily    Adult BMI 39.0-39.9 kg/sq m  Continue to work on fitness and dietary measures to improve BMI  Orders:    Semaglutide-Weight Management (Wegovy) 0.25 MG/0.5ML; Inject 0.25 mg under the skin weekly    Prediabetes  Hemoglobin A1c stable at 5.8%-continue to work on fitness and dietary to improve BMI  Orders:    Semaglutide-Weight Management (Wegovy) 0.25 MG/0.5ML; Inject 0.25 mg under the skin weekly    Hemoglobin A1C; Future    Familial hypercholesterolemia  Low HDL syndrome-patient is on statin-continue to work on fitness and dietary to improve BMI  Orders:    Semaglutide-Weight Management (Wegovy) 0.25 MG/0.5ML; Inject 0.25 mg under the skin weekly    Lipid panel; Future    atorvastatin (LIPITOR) 10 mg tablet; Take 1 tablet (10 mg total) by mouth daily    Restless leg syndrome  Continue current regimen  Orders:    pramipexole (MIRAPEX) 0.75 MG tablet; Take 1 tablet (0.75 mg total) by mouth daily at bedtime    Anxiety and depression  Stable on meds    Orders:    sertraline (ZOLOFT) 100 mg tablet; Take 1.5 tablets (150 mg total) by mouth in the morning    Seasonal allergies  Refill needed  Orders:    montelukast (SINGULAIR) 10 mg tablet; Take 1 tablet (10 mg total) by mouth daily at bedtime    History of papillary adenocarcinoma of thyroid  Surgical staging: pT1,pNx,pMx  S/P Thyroidectomy - 2011          Immunizations and preventive care screenings were  discussed with patient today. Appropriate education was printed on patient's after visit summary.    Discussed risks and benefits of prostate cancer screening. We discussed the controversial history of PSA screening for prostate cancer in the [place] as well as the risk of over detection and over treatment of prostate cancer by way of PSA screening.  The patient understands that PSA blood testing is an imperfect way to screen for prostate cancer and that elevated PSA levels in the blood may also be caused by infection, inflammation, prostatic trauma or manipulation, urological procedures, or by benign prostatic enlargement.    The role of the digital rectal examination in prostate cancer screening was also discussed and I discussed with him that there is large interobserver variability in the findings of digital rectal examination.    Counseling:  Alcohol/drug use: discussed moderation in alcohol intake, the recommendations for healthy alcohol use  Dental Health: discussed importance of regular tooth brushing, flossing, and dental visits.  Injury prevention: discussed safety/seat belts, safety helmets, smoke detectors, carbon monoxide detectors,  Sexual health: discussed sexually transmitted diseases, partner selection, use of condoms, avoidance of unintended pregnancy, and contraceptive alternatives.  Exercise: the importance of regular exercise/physical activity was discussed. Recommend exercise 3-5 times per week for at least 30 minutes.          History of Present Illness   Chief Complaint   Patient presents with    Follow-up     6 month follow up       Adult Annual Physical:  Patient presents for annual physical.     Diet and Physical Activity:  - Diet/Nutrition: poor diet.  - Exercise: walking.    General Health:  - Sleep: sleeps well.  - Hearing: normal hearing bilateral ears and decreased hearing bilateral ears.  - Vision: wears glasses and most recent eye exam < 1 year ago.  - Dental: regular dental  "visits.     Health:  - History of STDs: no.   - Urinary symptoms: none.     ASA derm  Review of Systems  Component      Latest Ref Rng 11/4/2024   Sodium      135 - 147 mmol/L 142    Potassium      3.5 - 5.3 mmol/L 4.0    Chloride      96 - 108 mmol/L 104    Carbon Dioxide      21 - 32 mmol/L 29    ANION GAP      4 - 13 mmol/L 9    BUN      5 - 25 mg/dL 18    Creatinine      0.60 - 1.30 mg/dL 0.88    GLUCOSE, FASTING      65 - 99 mg/dL 100 (H)    Calcium      8.4 - 10.2 mg/dL 8.3 (L)    AST      13 - 39 U/L 16    ALT      7 - 52 U/L 17    ALK PHOS      34 - 104 U/L 57    Total Protein      6.4 - 8.4 g/dL 6.7    Albumin      3.5 - 5.0 g/dL 4.0    Total Bilirubin      0.20 - 1.00 mg/dL 0.42    GFR, Calculated      ml/min/1.73sq m 95    Cholesterol      See Comment mg/dL 149    Triglycerides      See Comment mg/dL 170 (H)    HDL      >=40 mg/dL 38 (L)    LDL Calculated      0 - 100 mg/dL 77    Hemoglobin A1C      Normal 4.0-5.6%; PreDiabetic 5.7-6.4%; Diabetic >=6.5%; Glycemic control for adults with diabetes <7.0% % 5.8 (H)    eAG, EST AVG Glucose      mg/dl 120    PSA      0.000 - 4.000 ng/mL 2.319    TSH 3RD GENERATON      0.450 - 4.500 uIU/mL 0.958    THYROGLOBULIN AB      0.0 - 0.9 IU/mL <1.0 per endohemoglobin stable   Thyroglobulin-RIVER      1.4 - 29.2 ng/mL 0.1 (L) -  per endo       Objective     /88   Pulse 67   Temp (!) 97.4 °F (36.3 °C) (Temporal)   Resp 16   Ht 5' 7.76\" (1.721 m)   Wt 118 kg (260 lb)   SpO2 98%   BMI 39.81 kg/m²     Physical Exam  Vitals and nursing note reviewed.   Constitutional:       General: He is not in acute distress.     Appearance: Normal appearance. He is well-developed. He is obese.   HENT:      Head: Normocephalic and atraumatic.      Right Ear: Tympanic membrane normal.      Left Ear: Tympanic membrane normal.      Nose: Nose normal.      Mouth/Throat:      Mouth: Mucous membranes are moist.   Eyes:      Conjunctiva/sclera: Conjunctivae normal.   Neck:      " Vascular: No carotid bruit.   Cardiovascular:      Rate and Rhythm: Normal rate and regular rhythm.      Heart sounds: No murmur heard.  Pulmonary:      Effort: Pulmonary effort is normal. No respiratory distress.      Breath sounds: Normal breath sounds.   Abdominal:      General: Bowel sounds are normal.      Palpations: Abdomen is soft.      Tenderness: There is no abdominal tenderness.   Musculoskeletal:         General: No swelling.      Right lower leg: No edema.      Left lower leg: No edema.   Skin:     Findings: No rash.   Neurological:      Mental Status: He is alert and oriented to person, place, and time.   Psychiatric:         Mood and Affect: Mood normal.         Behavior: Behavior normal.         Thought Content: Thought content normal.         Judgment: Judgment normal.

## 2024-11-08 NOTE — ASSESSMENT & PLAN NOTE
Orders:    Semaglutide-Weight Management (Wegovy) 0.25 MG/0.5ML; Inject 0.25 mg under the skin weekly    lisinopril (ZESTRIL) 10 mg tablet; Take 1 tablet (10 mg total) by mouth in the morning    lisinopril (ZESTRIL) 20 mg tablet; Take 1 tablet (20 mg total) by mouth daily

## 2024-11-08 NOTE — ASSESSMENT & PLAN NOTE
Hemoglobin A1c stable at 5.8%-continue to work on fitness and dietary to improve BMI  Orders:    Semaglutide-Weight Management (Wegovy) 0.25 MG/0.5ML; Inject 0.25 mg under the skin weekly    Hemoglobin A1C; Future

## 2024-11-08 NOTE — TELEPHONE ENCOUNTER
PA for Wegovy 0.25 MG/0.5ML SUBMITTED to [employer]     via    []CMM-KEY:   [x]Surescripts-Case ID # [id number]   []Availity-Auth ID # NDC #   []Faxed to plan   []Other website   []Phone call Case ID #     []PA sent as URGENT    All office notes, labs and other pertaining documents and studies sent. Clinical questions answered. Awaiting determination from insurance company.     Turnaround time for your insurance to make a decision on your Prior Authorization can take 7-21 business days.

## 2024-11-11 NOTE — TELEPHONE ENCOUNTER
PA for Wegovy 0.25MG/0.5ML APPROVED     Date(s) approved: October 9, 2024 to June 6, 2025     Case #[id number]     Patient advised by          []MyChart Message  [x]Phone call   []LMOM  []L/M to call office as no active Communication consent on file  []Unable to leave detailed message as VM not approved on Communication consent       Pharmacy advised by    [x]Fax  []Phone call    Approval letter scanned into Media No Waiting for letter

## 2024-12-09 DIAGNOSIS — I10 PRIMARY HYPERTENSION: Primary | ICD-10-CM

## 2024-12-09 DIAGNOSIS — R73.03 PREDIABETES: ICD-10-CM

## 2024-12-09 DIAGNOSIS — E78.01 FAMILIAL HYPERCHOLESTEROLEMIA: ICD-10-CM

## 2024-12-09 RX ORDER — SEMAGLUTIDE 0.5 MG/.5ML
INJECTION, SOLUTION SUBCUTANEOUS
Qty: 2 ML | Refills: 0 | Status: SHIPPED | OUTPATIENT
Start: 2024-12-09

## 2025-01-10 DIAGNOSIS — E78.01 FAMILIAL HYPERCHOLESTEROLEMIA: ICD-10-CM

## 2025-01-10 DIAGNOSIS — I10 PRIMARY HYPERTENSION: ICD-10-CM

## 2025-01-10 DIAGNOSIS — R73.03 PREDIABETES: ICD-10-CM

## 2025-01-10 RX ORDER — SEMAGLUTIDE 1.7 MG/.75ML
INJECTION, SOLUTION SUBCUTANEOUS
Qty: 3 ML | Refills: 0 | Status: SHIPPED | OUTPATIENT
Start: 2025-01-10

## 2025-02-07 DIAGNOSIS — R73.03 PREDIABETES: ICD-10-CM

## 2025-02-07 DIAGNOSIS — E78.01 FAMILIAL HYPERCHOLESTEROLEMIA: ICD-10-CM

## 2025-02-07 DIAGNOSIS — I10 PRIMARY HYPERTENSION: ICD-10-CM

## 2025-02-07 RX ORDER — SEMAGLUTIDE 1.7 MG/.75ML
INJECTION, SOLUTION SUBCUTANEOUS
Qty: 3 ML | Refills: 0 | Status: SHIPPED | OUTPATIENT
Start: 2025-02-07

## 2025-03-04 ENCOUNTER — HOSPITAL ENCOUNTER (EMERGENCY)
Facility: HOSPITAL | Age: 60
Discharge: HOME | End: 2025-03-04
Attending: EMERGENCY MEDICINE
Payer: OTHER MISCELLANEOUS

## 2025-03-04 VITALS
OXYGEN SATURATION: 96 % | HEIGHT: 67 IN | DIASTOLIC BLOOD PRESSURE: 100 MMHG | BODY MASS INDEX: 36.57 KG/M2 | WEIGHT: 233 LBS | SYSTOLIC BLOOD PRESSURE: 157 MMHG | TEMPERATURE: 97.4 F | HEART RATE: 91 BPM | RESPIRATION RATE: 20 BRPM

## 2025-03-04 DIAGNOSIS — S09.90XA CLOSED HEAD INJURY, INITIAL ENCOUNTER: ICD-10-CM
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
## 2025-03-04 PROCEDURE — 99283 EMERGENCY DEPT VISIT LOW MDM: CPT

## 2025-03-04 PROCEDURE — 99284 EMERGENCY DEPT VISIT MOD MDM: CPT | Performed by: EMERGENCY MEDICINE

## 2025-03-04 NOTE — ED PROVIDER NOTES
Time reflects when diagnosis was documented in both MDM as applicable and the Disposition within this note       Time User Action Codes Description Comment    3/4/2025  4:11 PM Jamshid Menjivar Add [W19.XXXA] Fall, initial encounter     3/4/2025  4:11 PM Jamshid Menjivar Add [S09.90XA] Closed head injury, initial encounter           ED Disposition       ED Disposition   Discharge    Condition   Stable    Date/Time   Tue Mar 4, 2025  4:11 PM    Comment   Dayne Kennedy discharge to home/self care.                   Assessment & Plan       Medical Decision Making  Patient is a 59-year-old male presenting for a fall with head strike.    Differential includes but not limited to closed head injury, doubt intracranial pathology, doubt other injuries.  [place] CT head rule used.  No need to scan at this time.  No other acute imaging needed.    Patient cleared for discharge with occupational medicine follow-up and return precautions.             Medications - No data to display    ED Risk Strat Scores                            SBIRT 20yo+      Flowsheet Row Most Recent Value   Initial Alcohol Screen: US AUDIT-C     1. How often do you have a drink containing alcohol? 0 Filed at: 03/04/2025 1459   2. How many drinks containing alcohol do you have on a typical day you are drinking?  0 Filed at: 03/04/2025 1459   3a. Male UNDER 65: How often do you have five or more drinks on one occasion? 0 Filed at: 03/04/2025 1459   Audit-C Score 0 Filed at: 03/04/2025 1459   KLEBER: How many times in the past year have you...    Used an illegal drug or used a prescription medication for non-medical reasons? Never Filed at: 03/04/2025 1459                            History of Present Illness       Chief Complaint   Patient presents with    Fall     Per pt was on a ladder slipped and fell back off the ladder about 5 feet and hit his head, instantly has left ear ringing and headache. -LOC, -thinners/ASA       Past Medical History:   Diagnosis  Date    Asthma     Cancer (HCC)     Depression     Hypertension       Past Surgical History:   Procedure Laterality Date    APPENDECTOMY  2020    ARTHROSCOPIC REPAIR ACL Left 2000    HERNIA REPAIR      THYROID LOBECTOMY  2011      Family History   Problem Relation Age of Onset    Dementia Mother     Hypertension Mother     Hypertension Father     Lung cancer Father       Social History     Tobacco Use    Smoking status: Never    Smokeless tobacco: Never   Vaping Use    Vaping status: Never Used   Substance Use Topics    Drug use: Never      E-Cigarette/Vaping    E-Cigarette Use Never User       E-Cigarette/Vaping Substances      I have reviewed and agree with the history as documented.     Patient is a 59-year-old male without significant past medical history presenting after fall with head strike.  Patient states that he fell off a ladder at work earlier today, landing on his buttocks and then falling back and hitting his head.  He said that he had some associated ringing in his ear this all started but did not lose consciousness.  Patient was able to get up to walk and ambulate without difficulty.  Patient denies nausea, vomiting, numbness, tingling, or weakness since.  He denies pain or injury elsewhere.  Patient states that he had an concussions in the past so he is concerned that that will happen again.        Review of Systems        Objective       ED Triage Vitals [03/04/25 1456]   Temperature Pulse Blood Pressure Respirations SpO2 Patient Position - Orthostatic VS   (!) 97.4 °F (36.3 °C) 91 157/100 20 96 % Sitting      Temp Source Heart Rate Source BP Location FiO2 (%) Pain Score    Temporal Monitor Left arm -- 2      Vitals      Date and Time Temp Pulse SpO2 Resp BP Pain Score FACES Pain Rating User   03/04/25 1458 -- -- 96 % -- -- -- -- AM   03/04/25 1456 97.4 °F (36.3 °C) 91 96 % 20 157/100 2 -- AM            Physical Exam  Vitals and nursing note reviewed.   Constitutional:       General: He is not in  acute distress.     Appearance: Normal appearance. He is not ill-appearing, toxic-appearing or diaphoretic.   HENT:      Head: Normocephalic and atraumatic.      Right Ear: Tympanic membrane, ear canal and external ear normal.      Left Ear: Tympanic membrane, ear canal and external ear normal.      Mouth/Throat:      Mouth: Mucous membranes are moist.      Pharynx: Oropharynx is clear.   Eyes:      Extraocular Movements: Extraocular movements intact.      Pupils: Pupils are equal, round, and reactive to light.   Cardiovascular:      Rate and Rhythm: Normal rate and regular rhythm.      Heart sounds: Normal heart sounds.   Pulmonary:      Effort: Pulmonary effort is normal.      Breath sounds: Normal breath sounds.   Abdominal:      General: Abdomen is flat.      Palpations: Abdomen is soft.   Musculoskeletal:         General: Normal range of motion.      Cervical back: Normal range of motion and neck supple. No tenderness.   Skin:     General: Skin is warm and dry.   Neurological:      General: No focal deficit present.      Mental Status: He is alert and oriented to person, place, and time.      Cranial Nerves: No cranial nerve deficit.      Sensory: No sensory deficit.      Motor: No weakness.         Results Reviewed       None            No orders to display       Procedures    ED Medication and Procedure Management   Prior to Admission Medications   Prescriptions Last Dose Informant Patient Reported? Taking?   Cholecalciferol (Vitamin D3) 50 MCG (2000 UT) TABS   Yes No   Sig: Take 2,000 Units by mouth daily   Glucosamine HCl (GLUCOSAMINE PO)  Self Yes No   Sig: Take by mouth daily   Multiple Vitamin (multivitamin) capsule   Yes No   Sig: Take 1 capsule by mouth daily   Omega-3 Fatty Acids (Fish Oil) 1000 MG CPDR   Yes No   Sig: Take by mouth   Semaglutide-Weight Management (Wegovy) 1.7 MG/0.75ML   No No   Sig: Inject 1.7 mg under the skin weekly   albuterol (PROVENTIL HFA,VENTOLIN HFA) 90 mcg/act inhaler   Yes  No   Sig: Inhale 2 puffs every 4 (four) hours as needed   atorvastatin (LIPITOR) 10 mg tablet   No No   Sig: Take 1 tablet (10 mg total) by mouth daily   levothyroxine 125 mcg tablet   Yes No   Sig: Take 125 mcg by mouth daily   lisinopril (ZESTRIL) 10 mg tablet   No No   Sig: Take 1 tablet (10 mg total) by mouth in the morning   lisinopril (ZESTRIL) 20 mg tablet   No No   Sig: Take 1 tablet (20 mg total) by mouth daily   montelukast (SINGULAIR) 10 mg tablet   No No   Sig: Take 1 tablet (10 mg total) by mouth daily at bedtime   omeprazole (PriLOSEC OTC) 20 MG tablet   Yes No   pramipexole (MIRAPEX) 0.75 MG tablet   No No   Sig: Take 1 tablet (0.75 mg total) by mouth daily at bedtime   sertraline (ZOLOFT) 100 mg tablet   No No   Sig: Take 1.5 tablets (150 mg total) by mouth in the morning      Facility-Administered Medications: None     Discharge Medication List as of 3/4/2025  4:15 PM        CONTINUE these medications which have NOT CHANGED    Details   albuterol (PROVENTIL HFA,VENTOLIN HFA) 90 mcg/act inhaler Inhale 2 puffs every 4 (four) hours as needed, Starting Tue 11/2/2021, Historical Med      atorvastatin (LIPITOR) 10 mg tablet Take 1 tablet (10 mg total) by mouth daily, Starting Fri 11/8/2024, Normal      Cholecalciferol (Vitamin D3) 50 MCG (2000 UT) TABS Take 2,000 Units by mouth daily, Historical Med      Glucosamine HCl (GLUCOSAMINE PO) Take by mouth daily, Historical Med      levothyroxine 125 mcg tablet Take 125 mcg by mouth daily, Starting Tue 11/16/2021, Until Wed 5/15/2024, Historical Med      !! lisinopril (ZESTRIL) 10 mg tablet Take 1 tablet (10 mg total) by mouth in the morning, Starting Fri 11/8/2024, Normal      !! lisinopril (ZESTRIL) 20 mg tablet Take 1 tablet (20 mg total) by mouth daily, Starting Fri 11/8/2024, Normal      montelukast (SINGULAIR) 10 mg tablet Take 1 tablet (10 mg total) by mouth daily at bedtime, Starting Fri 11/8/2024, Normal      Multiple Vitamin (multivitamin) capsule  Take 1 capsule by mouth daily, Historical Med      Omega-3 Fatty Acids (Fish Oil) 1000 MG CPDR Take by mouth, Historical Med      omeprazole (PriLOSEC OTC) 20 MG tablet Historical Med      pramipexole (MIRAPEX) 0.75 MG tablet Take 1 tablet (0.75 mg total) by mouth daily at bedtime, Starting Fri 11/8/2024, Normal      Semaglutide-Weight Management (Wegovy) 1.7 MG/0.75ML Inject 1.7 mg under the skin weekly, Normal      sertraline (ZOLOFT) 100 mg tablet Take 1.5 tablets (150 mg total) by mouth in the morning, Starting Fri 11/8/2024, Normal       !! - Potential duplicate medications found. Please discuss with provider.          ED SEPSIS DOCUMENTATION   Time reflects when diagnosis was documented in both MDM as applicable and the Disposition within this note       Time User Action Codes Description Comment    3/4/2025  4:11 PM Jamshid Menjivar [W19.XXXA] Fall, initial encounter     3/4/2025  4:11 PM Jamshid Menjivar [S09.90XA] Closed head injury, initial encounter                  Jamshid Menjivar MD  03/05/25 2006

## 2025-03-04 NOTE — Clinical Note
Dayne Kennedy was seen and treated in our emergency department on 3/4/2025.                Diagnosis:     Dayne  .    He may return on this date:     Patient was seen in the emergency department today.  He is able to return to work as tolerated and/or when occupational medicine clears him.     If you have any questions or concerns, please don't hesitate to call.      Jamshid Menjivar MD    ______________________________           _______________          _______________  Hospital Representative                              Date                                Time

## 2025-03-04 NOTE — ED ATTENDING ATTESTATION
"I, Giles Shultz MD, saw and evaluated the patient. I have discussed the patient with the resident and agree with the resident's findings, Plan of Care, and MDM as documented in the resident's note, except where noted. All available labs and Radiology studies were reviewed.  I was present for key portions of any procedure(s) performed by the resident and I was immediately available to provide assistance.    At this point I agree with the current assessment done in the Emergency Department.  I have conducted an independent evaluation of this patient a history and physical is as follows:    58 yo obese male with history of asthma, HTN, major depressive disorder, prediabetes, and hypothyroidism presents to the ED for evaluation s/p a mechanical fall. The patient fell backwards off a small ladder just prior to arrival, landing on his buttocks then striking his posterior head on the ground. Fall height was about 5 feet. No LOC. Since the event he has been experiencing a \"ringing\" in the left ear and a posterior headache. No neck pain, back pain, nausea, vomiting, or visual disturbance. He was able to get up immediately following the fall. No dizziness, lightheadedness, numbness, or weakness. No other injuries or complaints.    ROS: per resident physician note    Gen: NAD, AA&Ox3  HEENT: PERRL, EOMI, no hemotympanum, no nystagmus  Neck: supple, no midline bony tenderness  CV: RRR  Lungs: CTA B/L  Abdomen: soft, NT/ND  Back: no bony pain  Ext: no swelling or deformity  Neuro: 5/5 strength all extremities, sensation grossly intact, (+) steady gait  Skin: no rash    ED Course  The patient is very well appearing with stable vital signs and a benign physical examination. Presentation is consistent with a minor closed head injury +/- concussion. Very low clinical suspicion for ICH or skull fracture. The patient declined pain medications and does not want to pursue advanced imaging at this time. Plan for supportive care and " close follow up with the [facility] later this week. He is agreeable to this plan. Strict return precautions provided.      Critical Care Time  Procedures

## 2025-03-04 NOTE — DISCHARGE INSTRUCTIONS
Please follow-up with your employers occupational medicine and concussion [facility]-a referral was made for you.  Please return to the ED with new or worsening symptoms.

## 2025-03-07 ENCOUNTER — OCCMED (OUTPATIENT)
Dept: URGENT CARE | Facility: CLINIC | Age: 60
End: 2025-03-07
Payer: OTHER MISCELLANEOUS

## 2025-03-07 DIAGNOSIS — E78.01 FAMILIAL HYPERCHOLESTEROLEMIA: ICD-10-CM

## 2025-03-07 DIAGNOSIS — S00.80XD: Primary | ICD-10-CM

## 2025-03-07 DIAGNOSIS — R73.03 PREDIABETES: Primary | ICD-10-CM

## 2025-03-07 DIAGNOSIS — I10 PRIMARY HYPERTENSION: ICD-10-CM

## 2025-03-07 PROCEDURE — G0382 LEV 3 HOSP TYPE B ED VISIT: HCPCS

## 2025-03-07 PROCEDURE — 99283 EMERGENCY DEPT VISIT LOW MDM: CPT

## 2025-03-07 RX ORDER — SEMAGLUTIDE 2.4 MG/.75ML
INJECTION, SOLUTION SUBCUTANEOUS
Qty: 3 ML | Refills: 0 | Status: SHIPPED | OUTPATIENT
Start: 2025-03-07

## 2025-03-11 ENCOUNTER — APPOINTMENT (OUTPATIENT)
Dept: URGENT CARE | Facility: CLINIC | Age: 60
End: 2025-03-11
Payer: OTHER MISCELLANEOUS

## 2025-03-11 PROCEDURE — 99213 OFFICE O/P EST LOW 20 MIN: CPT

## 2025-03-17 ENCOUNTER — APPOINTMENT (OUTPATIENT)
Dept: URGENT CARE | Facility: CLINIC | Age: 60
End: 2025-03-17
Payer: OTHER MISCELLANEOUS

## 2025-03-17 PROCEDURE — 99213 OFFICE O/P EST LOW 20 MIN: CPT

## 2025-03-24 ENCOUNTER — APPOINTMENT (OUTPATIENT)
Dept: URGENT CARE | Facility: CLINIC | Age: 60
End: 2025-03-24
Payer: OTHER MISCELLANEOUS

## 2025-03-24 PROCEDURE — 99213 OFFICE O/P EST LOW 20 MIN: CPT | Performed by: PHYSICIAN ASSISTANT

## 2025-04-02 DIAGNOSIS — I10 PRIMARY HYPERTENSION: ICD-10-CM

## 2025-04-02 DIAGNOSIS — R73.03 PREDIABETES: ICD-10-CM

## 2025-04-02 DIAGNOSIS — E78.01 FAMILIAL HYPERCHOLESTEROLEMIA: ICD-10-CM

## 2025-04-02 RX ORDER — SEMAGLUTIDE 2.4 MG/.75ML
INJECTION, SOLUTION SUBCUTANEOUS
Qty: 3 ML | Refills: 1 | Status: SHIPPED | OUTPATIENT
Start: 2025-04-02

## 2025-05-12 ENCOUNTER — OFFICE VISIT (OUTPATIENT)
Dept: FAMILY MEDICINE CLINIC | Facility: CLINIC | Age: 60
End: 2025-05-12
Payer: COMMERCIAL

## 2025-05-12 VITALS
DIASTOLIC BLOOD PRESSURE: 86 MMHG | SYSTOLIC BLOOD PRESSURE: 130 MMHG | HEIGHT: 68 IN | HEART RATE: 63 BPM | BODY MASS INDEX: 35.49 KG/M2 | WEIGHT: 234.2 LBS | TEMPERATURE: 98.2 F | OXYGEN SATURATION: 96 %

## 2025-05-12 DIAGNOSIS — J30.2 SEASONAL ALLERGIES: ICD-10-CM

## 2025-05-12 DIAGNOSIS — J45.20 MILD INTERMITTENT REACTIVE AIRWAY DISEASE WITHOUT COMPLICATION: ICD-10-CM

## 2025-05-12 DIAGNOSIS — Z12.11 SCREENING FOR COLON CANCER: ICD-10-CM

## 2025-05-12 DIAGNOSIS — I10 PRIMARY HYPERTENSION: ICD-10-CM

## 2025-05-12 DIAGNOSIS — Z00.00 ANNUAL PHYSICAL EXAM: Primary | ICD-10-CM

## 2025-05-12 DIAGNOSIS — R73.03 PREDIABETES: ICD-10-CM

## 2025-05-12 DIAGNOSIS — Z12.5 SCREENING FOR PROSTATE CANCER: ICD-10-CM

## 2025-05-12 DIAGNOSIS — E89.0 POSTOPERATIVE HYPOTHYROIDISM: ICD-10-CM

## 2025-05-12 DIAGNOSIS — G25.81 RESTLESS LEG SYNDROME: ICD-10-CM

## 2025-05-12 DIAGNOSIS — E78.01 FAMILIAL HYPERCHOLESTEROLEMIA: ICD-10-CM

## 2025-05-12 PROCEDURE — 99396 PREV VISIT EST AGE 40-64: CPT | Performed by: FAMILY MEDICINE

## 2025-05-12 RX ORDER — ALBUTEROL SULFATE 90 UG/1
2 INHALANT RESPIRATORY (INHALATION) EVERY 4 HOURS PRN
Qty: 18 G | Refills: 1 | Status: SHIPPED | OUTPATIENT
Start: 2025-05-12

## 2025-05-12 RX ORDER — LISINOPRIL 10 MG/1
10 TABLET ORAL DAILY
Qty: 90 TABLET | Refills: 1 | Status: SHIPPED | OUTPATIENT
Start: 2025-05-12

## 2025-05-12 RX ORDER — MONTELUKAST SODIUM 10 MG/1
10 TABLET ORAL
Qty: 90 TABLET | Refills: 1 | Status: SHIPPED | OUTPATIENT
Start: 2025-05-12 | End: 2025-05-30

## 2025-05-12 RX ORDER — SEMAGLUTIDE 2.4 MG/.75ML
INJECTION, SOLUTION SUBCUTANEOUS
Qty: 3 ML | Refills: 1 | Status: SHIPPED | OUTPATIENT
Start: 2025-05-12

## 2025-05-12 RX ORDER — LISINOPRIL 20 MG/1
20 TABLET ORAL DAILY
Qty: 90 TABLET | Refills: 1 | Status: SHIPPED | OUTPATIENT
Start: 2025-05-12

## 2025-05-12 RX ORDER — PRAMIPEXOLE DIHYDROCHLORIDE 0.75 MG/1
0.75 TABLET ORAL
Qty: 90 TABLET | Refills: 1 | Status: SHIPPED | OUTPATIENT
Start: 2025-05-12

## 2025-05-12 NOTE — PROGRESS NOTES
Adult Annual Physical  Name: Dayne Kennedy      : 1965      MRN: [id number]  Encounter Provider: Alicia Barron DO  Encounter Date: 2025   Encounter department: [facility]    :  Assessment & Plan  Annual physical exam  Return in about 6 months (around 2025) for Recheck with Dr. Lindsey to review labs.        Primary hypertension  Continue same blood pressure regimen check metabolic profile  Orders:  •  lisinopril (ZESTRIL) 10 mg tablet; Take 1 tablet (10 mg total) by mouth in the morning  •  lisinopril (ZESTRIL) 20 mg tablet; Take 1 tablet (20 mg total) by mouth daily  •  Semaglutide-Weight Management (Wegovy) 2.4 MG/0.75ML; Inject 2.4 mg under the skin weekly  •  Comprehensive metabolic panel; Future    Restless leg syndrome    Orders:  •  pramipexole (MIRAPEX) 0.75 MG tablet; Take 1 tablet (0.75 mg total) by mouth daily at bedtime    Seasonal allergies    Orders:  •  albuterol (PROVENTIL HFA,VENTOLIN HFA) 90 mcg/act inhaler; Inhale 2 puffs every 4 (four) hours as needed for wheezing    Mild intermittent reactive airway disease without complication    Orders:  •  albuterol (PROVENTIL HFA,VENTOLIN HFA) 90 mcg/act inhaler; Inhale 2 puffs every 4 (four) hours as needed for wheezing    Prediabetes  Check in 6 months  Orders:  •  Semaglutide-Weight Management (Wegovy) 2.4 MG/0.75ML; Inject 2.4 mg under the skin weekly  •  Hemoglobin A1C; Future    Familial hypercholesterolemia    Orders:  •  Semaglutide-Weight Management (Wegovy) 2.4 MG/0.75ML; Inject 2.4 mg under the skin weekly  •  Lipid panel; Future    BMI 35.0-35.9,adult  Patient will continue maintenance dose of 2.4 mg Wegovy.  Lab work will be due prior to his next visit.  Orders:  •  Semaglutide-Weight Management (Wegovy) 2.4 MG/0.75ML; Inject 2.4 mg under the skin weekly    Screening for colon cancer    Orders:  •  Cologuard    Screening for prostate cancer    Orders:  •  PSA, Total Screen; Future    Postoperative  hypothyroidism  Patient does follow with endocrinology.           Preventive Screenings:  - Diabetes Screening: screening up-to-date  - Cholesterol Screening: screening not indicated and has hyperlipidemia   - Hepatitis C screening: screening up-to-date   - HIV screening: screening up-to-date   - Colon cancer screening: screening up-to-date   - Lung cancer screening: screening not indicated   - Prostate cancer screening: screening up-to-date     Immunizations:  - Immunizations due: Prevnar 20    Counseling/Anticipatory Guidance:    - Dental health: discussed importance of regular tooth brushing, flossing, and dental visits.   - Diet: discussed recommendations for a healthy/well-balanced diet.   - Exercise: the importance of regular exercise/physical activity was discussed. Recommend exercise 3-5 times per week for at least 30 minutes.       Depression Screening and Follow-up Plan: Patient was screened for depression during today's encounter. They screened negative with a PHQ-2 score of 2.          History of Present Illness   Chief Complaint   Patient presents with   • Annual Exam   • Follow-up     6 month ov-Wt management- on Wegovy, hypercholesterolemia, Restless leg syndrome.  Due for cologuard.     Adult Annual Physical:  Patient presents for annual physical.     Diet and Physical Activity:  - Diet/Nutrition: well balanced diet, portion control and low carb diet.  - Exercise: walking and 3-4 times a week on average.    Depression Screening:  - PHQ-2 Score: 2    General Health:  - Sleep: 7-8 hours of sleep on average and sleeps well.  - Hearing: normal hearing bilateral ears.  - Vision: wears glasses and most recent eye exam < 1 year ago.  - Dental: regular dental visits and brushes teeth once daily.     Health:  - History of STDs: no.   - Urinary symptoms: none.     Advanced Care Planning:  - Has an advanced directive?: no    - Has a durable medical POA?: no      Review of Systems   Constitutional:  Negative  "for unexpected weight change (Tolerating the Wegovy).        Feeling more energy         Patient was he is now on 234 pounds which is nearly a 10% weight reduction  Objective   /86 (BP Location: Left arm, Cuff Size: Large)   Pulse 63   Temp 98.2 °F (36.8 °C) (Temporal)   Ht 5' 8\" (1.727 m)   Wt 106 kg (234 lb 3.2 oz)   SpO2 96%   BMI 35.61 kg/m²     Physical Exam  Vitals and nursing note reviewed.   Constitutional:       General: He is not in acute distress.     Appearance: Normal appearance. He is well-developed.      Comments: 59-year-old male who appears his stated age   HENT:      Head: Normocephalic and atraumatic.      Nose: Nose normal.     Eyes:      Conjunctiva/sclera: Conjunctivae normal.     Neck:      Vascular: No carotid bruit.     Cardiovascular:      Rate and Rhythm: Normal rate and regular rhythm.      Heart sounds: No murmur heard.  Pulmonary:      Effort: Pulmonary effort is normal. No respiratory distress.      Breath sounds: Normal breath sounds.   Abdominal:      Palpations: Abdomen is soft.      Tenderness: There is no abdominal tenderness.     Musculoskeletal:         General: No swelling.      Comments: Gait is normal     Skin:     Findings: No rash.     Neurological:      Mental Status: He is alert and oriented to person, place, and time.     Psychiatric:         Mood and Affect: Mood normal.         Behavior: Behavior normal.         Thought Content: Thought content normal.         Judgment: Judgment normal.           "

## 2025-05-12 NOTE — ASSESSMENT & PLAN NOTE
Check in 6 months  Orders:  •  Semaglutide-Weight Management (Wegovy) 2.4 MG/0.75ML; Inject 2.4 mg under the skin weekly  •  Hemoglobin A1C; Future

## 2025-05-12 NOTE — ASSESSMENT & PLAN NOTE
Continue same blood pressure regimen check metabolic profile  Orders:  •  lisinopril (ZESTRIL) 10 mg tablet; Take 1 tablet (10 mg total) by mouth in the morning  •  lisinopril (ZESTRIL) 20 mg tablet; Take 1 tablet (20 mg total) by mouth daily  •  Semaglutide-Weight Management (Wegovy) 2.4 MG/0.75ML; Inject 2.4 mg under the skin weekly  •  Comprehensive metabolic panel; Future

## 2025-05-12 NOTE — PATIENT INSTRUCTIONS
"FOLLOW UP IN NOVEMBER with DR BALTAZAR MILNER     Routine physical for adults   The Basics   Written by the doctors and editors at [facility]   What is a physical? -- A physical is a routine visit, or \"check-up,\" with your doctor. You might also hear it called a \"wellness visit\" or \"preventive visit.\"  During each visit, the doctor will:   Ask about your physical and mental health   Ask about your habits, behaviors, and lifestyle   Do an exam   Give you vaccines if needed   Talk to you about any medicines you take   Give advice about your health   Answer your questions  Getting regular check-ups is an important part of taking care of your health. It can help your doctor find and treat any problems you have. But it's also important for preventing health problems.  A routine physical is different from a \"sick visit.\" A sick visit is when you see a doctor because of a health concern or problem. Since physicals are scheduled ahead of time, you can think about what you want to ask the doctor.  How often should I get a physical? -- It depends on your age and health. In general, for people age 21 years and older:   If you are younger than 50 years, you might be able to get a physical every 3 years.   If you are 50 years or older, your doctor might recommend a physical every year.  If you have an ongoing health condition, like diabetes or high blood pressure, your doctor will probably want to see you more often.  What happens during a physical? -- In general, each visit will include:   Physical exam - The doctor or nurse will check your height, weight, heart rate, and blood pressure. They will also look at your eyes and ears. They will ask about how you are feeling and whether you have any symptoms that bother you.   Medicines - It's a good idea to bring a list of all the medicines you take to each doctor visit. Your doctor will talk to you about your medicines and answer any questions. Tell them if you are having any side effects " "that bother you. You should also tell them if you are having trouble paying for any of your medicines.   Habits and behaviors - This includes:   Your diet   Your exercise habits   Whether you smoke, drink alcohol, or use drugs   Whether you are sexually active   Whether you feel safe at home  Your doctor will talk to you about things you can do to improve your health and lower your risk of health problems. They will also offer help and support. For example, if you want to quit smoking, they can give you advice and might prescribe medicines. If you want to improve your diet or get more physical activity, they can help you with this, too.   Lab tests, if needed - The tests you get will depend on your age and situation. For example, your doctor might want to check your:   Cholesterol   Blood sugar   Iron level   Vaccines - The recommended vaccines will depend on your age, health, and what vaccines you already had. Vaccines are very important because they can prevent certain serious or deadly infections.   Discussion of screening - \"Screening\" means checking for diseases or other health problems before they cause symptoms. Your doctor can recommend screening based on your age, risk, and preferences. This might include tests to check for:   Cancer, such as breast, prostate, cervical, ovarian, colorectal, prostate, lung, or skin cancer   Sexually transmitted infections, such as chlamydia and gonorrhea   Mental health conditions like depression and anxiety  Your doctor will talk to you about the different types of screening tests. They can help you decide which screenings to have. They can also explain what the results might mean.   Answering questions - The physical is a good time to ask the doctor or nurse questions about your health. If needed, they can refer you to other doctors or specialists, too.  Adults older than 65 years often need other care, too. As you get older, your doctor will talk to you about:   How to " prevent falling at home   Hearing or vision tests   Memory testing   How to take your medicines safely   Making sure that you have the help and support you need at home  All topics are updated as new evidence becomes available and our peer review process is complete.  This topic retrieved from [employer] on: May 02, 2024.  Topic 599854 Version 1.0  Release: 32.4.3 - C32.122  © 2024 UpToDate, Inc. and/or its affiliates. All rights reserved.  Consumer Information Use and Disclaimer   Disclaimer: This generalized information is a limited summary of diagnosis, treatment, and/or medication information. It is not meant to be comprehensive and should be used as a tool to help the user understand and/or assess potential diagnostic and treatment options. It does NOT include all information about conditions, treatments, medications, side effects, or risks that may apply to a specific patient. It is not intended to be medical advice or a substitute for the medical advice, diagnosis, or treatment of a health care provider based on the health care provider's examination and assessment of a patient's specific and unique circumstances. Patients must speak with a health care provider for complete information about their health, medical questions, and treatment options, including any risks or benefits regarding use of medications. This information does not endorse any treatments or medications as safe, effective, or approved for treating a specific patient. UpToDate, Inc. and its affiliates disclaim any warranty or liability relating to this information or the use thereof.The use of this information is governed by the Terms of Use, available at https://www.wolterskluwer.com/en/know/clinical-effectiveness-terms. 2024© UpToDate, Inc. and its affiliates and/or licensors. All rights reserved.  Copyright   © 2024 UpToDate, Inc. and/or its affiliates. All rights reserved.

## 2025-05-12 NOTE — ASSESSMENT & PLAN NOTE
Orders:  •  Semaglutide-Weight Management (Wegovy) 2.4 MG/0.75ML; Inject 2.4 mg under the skin weekly  •  Lipid panel; Future

## 2025-05-29 DIAGNOSIS — J30.2 SEASONAL ALLERGIES: ICD-10-CM

## 2025-05-30 RX ORDER — MONTELUKAST SODIUM 10 MG/1
10 TABLET ORAL
Qty: 90 TABLET | Refills: 1 | Status: SHIPPED | OUTPATIENT
Start: 2025-05-30

## 2025-06-11 ENCOUNTER — TELEPHONE (OUTPATIENT)
Dept: FAMILY MEDICINE CLINIC | Facility: CLINIC | Age: 60
End: 2025-06-11

## 2025-06-11 NOTE — TELEPHONE ENCOUNTER
PA for Wegovy 2.4MG/0.75ML APPROVED     Date(s) approved 05/12/2025-01/07/2026    Case #[id number]     Patient advised by          [x]MyChart Message  []Phone call   []LMOM  []L/M to call office as no active Communication consent on file  [x]Unable to leave detailed message as VM not approved on Communication consent       Pharmacy advised by    [x]Fax  []Phone call     Approval letter scanned into [employer] No Waiting for letter

## 2025-06-11 NOTE — TELEPHONE ENCOUNTER
PA for Wegovy 2.4MG/0.75ML SUBMITTED to [employer]    via    []CMM-KEY:   [x][employer]-Case ID # [id number]   []Availity-Auth ID # NDC #   []Faxed to plan   []Other website   []Phone call Case ID #     [x]PA sent as URGENT    All office notes, labs and other pertaining documents and studies sent. Clinical questions answered. Awaiting determination from insurance company.     Turnaround time for your insurance to make a decision on your Prior Authorization can take 7-21 business days.

## 2025-06-17 ENCOUNTER — RESULTS FOLLOW-UP (OUTPATIENT)
Dept: FAMILY MEDICINE CLINIC | Facility: CLINIC | Age: 60
End: 2025-06-17

## 2025-06-17 LAB — COLOGUARD RESULT REPORTABLE: NEGATIVE

## 2025-06-29 DIAGNOSIS — E78.01 FAMILIAL HYPERCHOLESTEROLEMIA: ICD-10-CM

## 2025-06-30 DIAGNOSIS — F41.9 ANXIETY AND DEPRESSION: ICD-10-CM

## 2025-06-30 DIAGNOSIS — F32.A ANXIETY AND DEPRESSION: ICD-10-CM

## 2025-06-30 RX ORDER — ATORVASTATIN CALCIUM 10 MG/1
10 TABLET, FILM COATED ORAL DAILY
Qty: 90 TABLET | Refills: 1 | Status: SHIPPED | OUTPATIENT
Start: 2025-06-30

## 2025-07-01 RX ORDER — SERTRALINE HYDROCHLORIDE 100 MG/1
TABLET, FILM COATED ORAL
Qty: 90 TABLET | Refills: 1 | Status: SHIPPED | OUTPATIENT
Start: 2025-07-01

## 2025-08-08 ENCOUNTER — TELEPHONE (OUTPATIENT)
Age: 60
End: 2025-08-08

## 2025-08-08 DIAGNOSIS — E78.01 FAMILIAL HYPERCHOLESTEROLEMIA: ICD-10-CM

## 2025-08-08 DIAGNOSIS — I10 PRIMARY HYPERTENSION: ICD-10-CM

## 2025-08-08 DIAGNOSIS — R73.03 PREDIABETES: ICD-10-CM

## 2025-08-08 RX ORDER — SEMAGLUTIDE 2.4 MG/.75ML
INJECTION, SOLUTION SUBCUTANEOUS
Qty: 3 ML | Refills: 1 | Status: SHIPPED | OUTPATIENT
Start: 2025-08-08